# Patient Record
Sex: FEMALE | Race: WHITE | NOT HISPANIC OR LATINO | Employment: PART TIME | URBAN - METROPOLITAN AREA
[De-identification: names, ages, dates, MRNs, and addresses within clinical notes are randomized per-mention and may not be internally consistent; named-entity substitution may affect disease eponyms.]

---

## 2017-04-04 ENCOUNTER — HOSPITAL ENCOUNTER (EMERGENCY)
Facility: MEDICAL CENTER | Age: 27
End: 2017-04-04
Payer: MEDICAID

## 2017-04-04 VITALS
HEIGHT: 65 IN | TEMPERATURE: 98.6 F | DIASTOLIC BLOOD PRESSURE: 89 MMHG | WEIGHT: 130 LBS | BODY MASS INDEX: 21.66 KG/M2 | RESPIRATION RATE: 18 BRPM | OXYGEN SATURATION: 98 % | HEART RATE: 89 BPM | SYSTOLIC BLOOD PRESSURE: 130 MMHG

## 2017-04-04 LAB — HCG UR QL: NEGATIVE

## 2017-04-04 PROCEDURE — 81025 URINE PREGNANCY TEST: CPT

## 2017-04-04 PROCEDURE — 302449 STATCHG TRIAGE ONLY (STATISTIC)

## 2017-04-04 NOTE — ED NOTES
Pt BIB [employer]  Chief Complaint   Patient presents with   • Other     pt is being arrested, needs a pregnancy test to be cleared for booking   Pt has no medical complaints  Pregnancy negative, officer waitng for official report

## 2020-12-22 ENCOUNTER — HOSPITAL ENCOUNTER (EMERGENCY)
Facility: MEDICAL CENTER | Age: 30
End: 2020-12-22
Attending: EMERGENCY MEDICINE
Payer: MEDICAID

## 2020-12-22 VITALS
SYSTOLIC BLOOD PRESSURE: 122 MMHG | OXYGEN SATURATION: 96 % | WEIGHT: 141.98 LBS | TEMPERATURE: 98.8 F | BODY MASS INDEX: 23.65 KG/M2 | RESPIRATION RATE: 18 BRPM | HEART RATE: 90 BPM | HEIGHT: 65 IN | DIASTOLIC BLOOD PRESSURE: 80 MMHG

## 2020-12-22 DIAGNOSIS — Z20.2 POSSIBLE EXPOSURE TO STD: ICD-10-CM

## 2020-12-22 DIAGNOSIS — N30.00 ACUTE CYSTITIS WITHOUT HEMATURIA: ICD-10-CM

## 2020-12-22 LAB
APPEARANCE UR: ABNORMAL
BACTERIA #/AREA URNS HPF: ABNORMAL /HPF
BILIRUB UR QL STRIP.AUTO: NEGATIVE
C TRACH DNA SPEC QL NAA+PROBE: NEGATIVE
COLOR UR: YELLOW
EPI CELLS #/AREA URNS HPF: ABNORMAL /HPF
GLUCOSE UR STRIP.AUTO-MCNC: NEGATIVE MG/DL
HCG UR QL: NEGATIVE
KETONES UR STRIP.AUTO-MCNC: 40 MG/DL
LEUKOCYTE ESTERASE UR QL STRIP.AUTO: NEGATIVE
MICRO URNS: ABNORMAL
MUCOUS THREADS #/AREA URNS HPF: ABNORMAL /HPF
N GONORRHOEA DNA SPEC QL NAA+PROBE: POSITIVE
NITRITE UR QL STRIP.AUTO: POSITIVE
PH UR STRIP.AUTO: 5.5 [PH] (ref 5–8)
PROT UR QL STRIP: NEGATIVE MG/DL
RBC # URNS HPF: ABNORMAL /HPF
RBC UR QL AUTO: NEGATIVE
SP GR UR REFRACTOMETRY: 1.03
SPECIMEN SOURCE: ABNORMAL
UNIDENT CRYS URNS QL MICRO: ABNORMAL /HPF
WBC #/AREA URNS HPF: ABNORMAL /HPF

## 2020-12-22 PROCEDURE — 87491 CHLMYD TRACH DNA AMP PROBE: CPT

## 2020-12-22 PROCEDURE — A9270 NON-COVERED ITEM OR SERVICE: HCPCS | Performed by: EMERGENCY MEDICINE

## 2020-12-22 PROCEDURE — 87086 URINE CULTURE/COLONY COUNT: CPT

## 2020-12-22 PROCEDURE — 81001 URINALYSIS AUTO W/SCOPE: CPT

## 2020-12-22 PROCEDURE — 700101 HCHG RX REV CODE 250: Performed by: EMERGENCY MEDICINE

## 2020-12-22 PROCEDURE — 700111 HCHG RX REV CODE 636 W/ 250 OVERRIDE (IP): Performed by: EMERGENCY MEDICINE

## 2020-12-22 PROCEDURE — 81025 URINE PREGNANCY TEST: CPT

## 2020-12-22 PROCEDURE — 700102 HCHG RX REV CODE 250 W/ 637 OVERRIDE(OP): Performed by: EMERGENCY MEDICINE

## 2020-12-22 PROCEDURE — 87591 N.GONORRHOEAE DNA AMP PROB: CPT

## 2020-12-22 PROCEDURE — 96372 THER/PROPH/DIAG INJ SC/IM: CPT

## 2020-12-22 PROCEDURE — 99284 EMERGENCY DEPT VISIT MOD MDM: CPT

## 2020-12-22 RX ORDER — AZITHROMYCIN 250 MG/1
1000 TABLET, FILM COATED ORAL ONCE
Status: COMPLETED | OUTPATIENT
Start: 2020-12-22 | End: 2020-12-22

## 2020-12-22 RX ORDER — CEFTRIAXONE 1 G/1
250 INJECTION, POWDER, FOR SOLUTION INTRAMUSCULAR; INTRAVENOUS ONCE
Status: COMPLETED | OUTPATIENT
Start: 2020-12-22 | End: 2020-12-22

## 2020-12-22 RX ORDER — NITROFURANTOIN 25; 75 MG/1; MG/1
100 CAPSULE ORAL 2 TIMES DAILY
Qty: 10 CAP | Refills: 0 | Status: SHIPPED | OUTPATIENT
Start: 2020-12-22 | End: 2020-12-27

## 2020-12-22 RX ORDER — ONDANSETRON 4 MG/1
4 TABLET, ORALLY DISINTEGRATING ORAL
Status: DISCONTINUED | OUTPATIENT
Start: 2020-12-22 | End: 2020-12-22 | Stop reason: HOSPADM

## 2020-12-22 RX ADMIN — CEFTRIAXONE SODIUM 250 MG: 1 INJECTION, POWDER, FOR SOLUTION INTRAMUSCULAR; INTRAVENOUS at 03:03

## 2020-12-22 RX ADMIN — LIDOCAINE HYDROCHLORIDE 0.9 ML: 10 INJECTION, SOLUTION INFILTRATION; PERINEURAL at 03:03

## 2020-12-22 RX ADMIN — AZITHROMYCIN MONOHYDRATE 1000 MG: 250 TABLET ORAL at 03:02

## 2020-12-22 NOTE — LETTER
12/24/2020               Ambrocio Mcallister  [address] [place] [address]        Dear Ambrocio (MR#[id number])    As we have been unable to contact you by phone, this letter is sent in regards to your, recent visit to the [facility] Emergency Department on 12/22/2020.  During the visit, tests were performed to assist the physician in a medical diagnosis.  A review of those tests requires that we notify you of the following:    Your culture test was positive for Gonorrhea, a sexually transmitted infection. This was already treated appropriately in the Emergency Department. .       Based on the above findings it is recommended that you seek testing for the presence of additional sexually transmitted infections from the Health Department. Also, it is advised that you inform your sexual partner(s) within the previous 60 days of the above findings and direct them to the Health Department for testing. Should your symptoms progress, it is important that you follow up with your primary care physician, your local urgent care office, or return to the emergency department for further work up in order to prevent long term health issues.      Thank you for your cooperation in the matter.    Sincerely,  ED Culture Follow-Up Staff  Erika Wright, PharmD    [facility], Emergency Department  [address], [place] [address]  [phone] (ED Culture Line)  [phone]

## 2020-12-22 NOTE — ED PROVIDER NOTES
"ED Provider Note        History obtained from: Patient, sexual partner    CHIEF COMPLAINT  Chief Complaint   Patient presents with   • Other     reports boyfriend may have been exposed to STD and would like to get \"checked out\"       HPI  Ambrocio Mcallister is a 30 y.o. female who presents with concern for STD exposure.  The patient is here because her boyfriend, with whom she is recent been sexually active began having symptoms concerning for gonorrhea or chlamydia.  She denies any vaginal discharge, malodor, burning with urine, flank pain, abdominal pain, fevers, chills, other symptoms.    REVIEW OF SYSTEMS    CONSTITUTIONAL:  No fever.  CARDIOVASCULAR:  No chest discomfort.  RESPIRATORY:  No pleuritic chest pain.  GASTROINTESTINAL:  No abdominal pain.    See HPI for further details.       PAST MEDICAL HISTORY  History reviewed. No pertinent past medical history.    FAMILY HISTORY  History reviewed. No pertinent family history.    SOCIAL HISTORY   reports that she has been smoking cigarettes. She has been smoking about 0.50 packs per day. She does not have any smokeless tobacco history on file. She reports that she does not drink alcohol or use drugs.    SURGICAL HISTORY  Past Surgical History:   Procedure Laterality Date   • LAPAROSCOPY  2/22/2014    Performed by Corinne E Capurro, M.D. at SURGERY [facility]   • SALPINGECTOMY  2/22/2014    Performed by Corinne E Capurro, M.D. at SURGERY [facility]       CURRENT MEDICATIONS  Home Medications    **Home medications have not yet been reviewed for this encounter**         ALLERGIES  No Known Allergies    PHYSICAL EXAM  VITAL SIGNS: /66   Pulse 80   Temp 36.7 °C (98 °F) (Temporal)   Resp 15   Ht 1.651 m (5' 5\")   Wt 64.4 kg (141 lb 15.6 oz)   LMP 11/10/2020   SpO2 92%   BMI 23.63 kg/m²    Gen: alert, no acute distress  HENT: ATNC  Eyes: normal conjunctiva  Resp: No resipiratory distress.   CV: No JVD, RRR  Abd: Non-distended, nontender  : " No CVA tenderness  Extremities: No deformity          LABS  Labs Reviewed   URINALYSIS - Abnormal; Notable for the following components:       Result Value    Character Hazy (*)     Ketones 40 (*)     Nitrite Positive (*)     All other components within normal limits   URINE MICROSCOPIC (W/UA) - Abnormal; Notable for the following components:    WBC 5-10 (*)     RBC 2-5 (*)     Bacteria Few (*)     All other components within normal limits   CHLAMYDIA/GC PCR URINE OR SWAB   HCG QUALITATIVE UR   REFRACTOMETER SG   URINE CULTURE-EXISTING-LESS THAN 48 HOURS        COURSE & MEDICAL DECISION MAKING  Pertinent Labs & Imaging studies reviewed. (See chart for details)    Patient presents with concern for exposure to STD.  She was empirically treated for gonorrhea and chlamydia.  She was counseled on obtaining testing for HIV and syphilis but prefers to obtain this on an outpatient basis rather be tested today.  She has no abdominal pain or other symptoms to suggest PID.  No change in her vaginal discharge to suggest bacterial vaginosis, yeast infection, or trichomoniasis.  Her urinalysis is nitrite positive and somewhat concerning for a possible mild urinary tract infection.  Although the patient does not appear to have symptoms, she will be treated with a course of Macrobid for potential urinary tract infection.    Appropriate PPE were worn during this encounter.    FINAL IMPRESSION  1. Acute cystitis without hematuria    2. Possible exposure to STD         DISPOSITION:  Patient will be discharged home in stable condition.    FOLLOW UP:  [address] [address]  [phone]        [facility] [address] [address] [phone]        [facility], Emergency Dept  [address]  [employer] [address] [phone]    If symptoms worsen      OUTPATIENT MEDICATIONS:  New Prescriptions    NITROFURANTOIN (MACROBID) 100  MG CAP    Take 1 Cap by mouth 2 times a day for 5 days.

## 2020-12-22 NOTE — DISCHARGE INSTRUCTIONS
You were seen in the emergency department with a concern for exposure to sexually transmitted infections.  You were treated for gonorrhea and chlamydia and testing has been sent.  These results should result tomorrow.  Urinalysis is concerning for possible urinary tract infection and you are being sent home with a prescription for medications for this.  Please take these as prescribed.    Please use safe sex practices, including universal use of condoms.    Sexually transmitted infections often run together.  We recommend also testing for HIV and syphilis.  This is available at any of the [facility] and [facility].    Please return to the emergency department or seek medical attention if you develop:  Flank pain, fevers, abdominal pain, abnormal vaginal discharge, any other new or concerning findings    ================================  Coronavirus Information    Your visit did NOT appear to relate to coronavirus, but if you or your family develop symptoms that concern you for coronavirus (please see CDC website for symptoms), please contact the [facility] hotline (or your local health department)  or your healthcare provider before going to a medical facility:    [facility]  24hr hotline: [phone]    Information is available from the Centers for Disease Control and Prevention  www.CDC.gov    and     [facility]  [email or web address]    If you are severely ill or having a hard time breathing, please immediatly seek medical care. Notify the  or Emergency Department Triage about your symptoms.

## 2020-12-22 NOTE — ED TRIAGE NOTES
"Chief Complaint   Patient presents with   • Other     reports boyfriend may have been exposed to STD and would like to get \"checked out\"     /66   Pulse 80   Temp 36.7 °C (98 °F) (Temporal)   Resp 15   Ht 1.651 m (5' 5\")   Wt 64.4 kg (141 lb 15.6 oz)   LMP 11/10/2020   SpO2 92%   BMI 23.63 kg/m²     Covid Screen Negative      "

## 2020-12-22 NOTE — ED NOTES
Reviewed discharge instructions and printed prescription x 1 w/ pt, verbalized understanding to information provided including follow up care, return precautions and medication, denied questions/concerns.  Pt ambulated from ED w/ friend.

## 2020-12-24 LAB
BACTERIA UR CULT: NORMAL
SIGNIFICANT IND 70042: NORMAL
SITE SITE: NORMAL
SOURCE SOURCE: NORMAL

## 2020-12-24 NOTE — ED NOTES
"ED Positive Culture Follow-up/Notification Note:    Date: 12/24/20     Patient seen in the ED on 12/22/2020 for concerns of a STD. Patient received Azithromycin 1g PO x 1 and Rocephin 250mg IM x 1 in the ED.     1. Acute cystitis without hematuria    2. Possible exposure to STD       Discharge Medication List as of 12/22/2020  3:35 AM      START taking these medications    Details   nitrofurantoin (MACROBID) 100 MG Cap Take 1 Cap by mouth 2 times a day for 5 days., Disp-10 Cap, R-0, Print Rx Paper             Allergies: Patient has no known allergies.     Vitals:    12/22/20 0145 12/22/20 0146 12/22/20 0325   BP:  126/66 122/80   Pulse:  80 90   Resp:  15 18   Temp:  36.7 °C (98 °F) 37.1 °C (98.8 °F)   TempSrc:  Temporal Temporal   SpO2:  92% 96%   Weight: 64.4 kg (141 lb 15.6 oz)     Height: 1.651 m (5' 5\")         Final cultures:   Results     Procedure Component Value Units Date/Time    URINE CULTURE-EXISTING-LESS THAN 48 HOURS [[id number]] Collected: 12/22/20 0250    Order Status: Completed Specimen: Urine Updated: 12/24/20 0934     Significant Indicator NEG     Source UR     Site -     Culture Result Mixed skin seven 10-50,000 cfu/mL    Narrative:      TEST Urine Culture WAS CANCELLED, 12/22/20 05:40 Indication for  culture:->Patient WITHOUT an indwelling Castillo HIS# [id number]  Indication for culture:->Patient WITHOUT an indwelling Castillo  catheter in place with new onset of Dysuria, Frequency,  Urgency, and/or Suprapubic pain    Chlamydia/GC PCR Urine Or Swab [[id number]]  (Abnormal) Collected: 12/22/20 0304    Order Status: Completed Specimen: Genital Updated: 12/22/20 1915     C. trachomatis by PCR Negative     N. gonorrhoeae by PCR POSITIVE     Source Urine    URINE CULTURE (ADD ON) [[id number]]     Order Status: Canceled Specimen: Urine     URINALYSIS (UA) [[id number]]  (Abnormal) Collected: 12/22/20 0250    Order Status: Completed Specimen: Genital Updated: 12/22/20 0311     Color Yellow     Character Hazy     " Ph 5.5     Glucose Negative mg/dL      Ketones 40 mg/dL      Protein Negative mg/dL      Bilirubin Negative     Nitrite Positive     Leukocyte Esterase Negative     Occult Blood Negative     Micro Urine Req Microscopic          Plan:   Patient treated for gonorrhea in the ER. No additional treatment necessary. Unable to reach the patient via telephone and left voicemail to call back. Patient was also given antibiotics for an UTI but did not complain of any s/s. If patient calls back will assess for s/s of UTI and discontinue antibiotics if she does not have any symptoms or complaints. Sent letter notifying the patient to abstain from sexual intercourse 7 days after antibiotic therapy is completed, to notify any sexual partners within the last 60 days to go the the Health Department for testing, to seek further STD/HIV testing, and to seek medical attention if symptoms persist.        Erika Wright, PharmD